# Patient Record
Sex: FEMALE | Race: WHITE | Employment: UNEMPLOYED | URBAN - METROPOLITAN AREA
[De-identification: names, ages, dates, MRNs, and addresses within clinical notes are randomized per-mention and may not be internally consistent; named-entity substitution may affect disease eponyms.]

---

## 2018-08-05 ENCOUNTER — HOSPITAL ENCOUNTER (EMERGENCY)
Age: 38
Discharge: HOME | End: 2018-08-05
Attending: EMERGENCY MEDICINE
Payer: MEDICARE

## 2018-08-05 VITALS
OXYGEN SATURATION: 98 % | TEMPERATURE: 98.4 F | BODY MASS INDEX: 23.92 KG/M2 | DIASTOLIC BLOOD PRESSURE: 85 MMHG | HEART RATE: 93 BPM | WEIGHT: 130 LBS | HEIGHT: 62 IN | RESPIRATION RATE: 16 BRPM | SYSTOLIC BLOOD PRESSURE: 136 MMHG

## 2018-08-05 DIAGNOSIS — H01.001 BLEPHARITIS OF RIGHT UPPER EYELID, UNSPECIFIED TYPE: Primary | ICD-10-CM

## 2018-08-05 PROCEDURE — 6370000000 HC RX 637 (ALT 250 FOR IP): Performed by: EMERGENCY MEDICINE

## 2018-08-05 PROCEDURE — 99283 EMERGENCY DEPT VISIT LOW MDM: CPT

## 2018-08-05 RX ORDER — ERYTHROMYCIN 5 MG/G
OINTMENT OPHTHALMIC
Qty: 1 G | Refills: 0 | Status: SHIPPED | OUTPATIENT
Start: 2018-08-05 | End: 2018-08-15

## 2018-08-05 RX ADMIN — FLUORESCEIN SODIUM 1 MG: 1 STRIP OPHTHALMIC at 17:52

## 2018-08-05 ASSESSMENT — PAIN SCALES - GENERAL: PAINLEVEL_OUTOF10: 8

## 2018-08-05 ASSESSMENT — PAIN DESCRIPTION - ORIENTATION: ORIENTATION: RIGHT

## 2018-08-05 ASSESSMENT — ENCOUNTER SYMPTOMS
EYE REDNESS: 0
EYE ITCHING: 1
EYE PAIN: 1

## 2018-08-05 ASSESSMENT — PAIN DESCRIPTION - PAIN TYPE: TYPE: ACUTE PAIN

## 2018-08-05 ASSESSMENT — PAIN DESCRIPTION - DESCRIPTORS: DESCRIPTORS: THROBBING

## 2018-08-05 ASSESSMENT — PAIN DESCRIPTION - LOCATION: LOCATION: EYE

## 2018-08-05 NOTE — ED PROVIDER NOTES
08/05/18 1740   BP: 136/85   Pulse: 93   Resp: 16   Temp: 98.4 °F (36.9 °C)   TempSrc: Oral   SpO2: 98%   Weight: 130 lb (59 kg)   Height: 5' 2\" (1.575 m)       Patient clinically has swelling of the right upper eyelid with tenderness without discrete hordeolum. I find this consistent with blepharitis. She is treated with erythromycin ophthalmic ointment. Warm compresses and massage to the eyelid. A Referral Given If Not Improved. MDM    CRITICAL CARE TIME   Total Critical Care time was  minutes, excluding separately reportable procedures. There was a high probability of clinically significant/life threatening deterioration in the patient's condition which required my urgent intervention. CONSULTS:  None    PROCEDURES:  Unless otherwise noted below, none     Procedures    FINAL IMPRESSION      1. Blepharitis of right upper eyelid, unspecified type          DISPOSITION/PLAN   DISPOSITION Decision To Discharge 08/05/2018 06:01:00 PM      PATIENT REFERRED TO:  DO Gil Peacock  [phone]      If not improved within 48 hours      DISCHARGE MEDICATIONS:  New Prescriptions    ERYTHROMYCIN (ROMYCIN) 5 MG/GM OPHTHALMIC OINTMENT    Apply a thin ribbon in your right eye 4 times daily.   May discontinue medication 2 days after eyelid swelling Redness resolved          (Please note that portions of this note were completed with a voice recognition program.  Efforts were made to edit the dictations but occasionally words are mis-transcribed.)    Maxx Soriano MD (electronically signed)  Attending Emergency Physician          Kacey Sewell MD  08/05/18 [id number]

## 2018-08-05 NOTE — ED TRIAGE NOTES
Patient arrives with right eye swelling and redness. Patient reports itching and pain. Patient denies visual changes. A/0 x3, ambulatory, resps even and unlabored on room air. Call bell in reach.

## 2018-11-25 ENCOUNTER — APPOINTMENT (OUTPATIENT)
Dept: GENERAL RADIOLOGY | Age: 38
End: 2018-11-25
Payer: COMMERCIAL

## 2018-11-25 ENCOUNTER — HOSPITAL ENCOUNTER (EMERGENCY)
Age: 38
Discharge: HOME | End: 2018-11-25
Attending: EMERGENCY MEDICINE
Payer: COMMERCIAL

## 2018-11-25 VITALS
BODY MASS INDEX: 23.92 KG/M2 | WEIGHT: 130 LBS | RESPIRATION RATE: 20 BRPM | DIASTOLIC BLOOD PRESSURE: 68 MMHG | OXYGEN SATURATION: 99 % | SYSTOLIC BLOOD PRESSURE: 118 MMHG | TEMPERATURE: 98.3 F | HEART RATE: 87 BPM | HEIGHT: 62 IN
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
## 2018-11-25 DIAGNOSIS — S93.402A MODERATE LEFT ANKLE SPRAIN, INITIAL ENCOUNTER: Primary | ICD-10-CM

## 2018-11-25 PROCEDURE — 73610 X-RAY EXAM OF ANKLE: CPT

## 2018-11-25 PROCEDURE — 99283 EMERGENCY DEPT VISIT LOW MDM: CPT

## 2018-11-25 ASSESSMENT — ENCOUNTER SYMPTOMS
BACK PAIN: 0
SHORTNESS OF BREATH: 0
ABDOMINAL PAIN: 0

## 2018-11-25 ASSESSMENT — PAIN DESCRIPTION - LOCATION
LOCATION: ANKLE
LOCATION: ANKLE

## 2018-11-25 ASSESSMENT — PAIN DESCRIPTION - DESCRIPTORS: DESCRIPTORS: CONSTANT;ACHING;SHARP

## 2018-11-25 ASSESSMENT — PAIN DESCRIPTION - PROGRESSION: CLINICAL_PROGRESSION: GRADUALLY WORSENING

## 2018-11-25 ASSESSMENT — PAIN DESCRIPTION - ORIENTATION
ORIENTATION: LEFT
ORIENTATION: LEFT

## 2018-11-25 ASSESSMENT — PAIN SCALES - GENERAL: PAINLEVEL_OUTOF10: 10

## 2018-11-25 ASSESSMENT — PAIN DESCRIPTION - ONSET: ONSET: ON-GOING

## 2018-11-25 ASSESSMENT — PAIN DESCRIPTION - PAIN TYPE
TYPE: ACUTE PAIN
TYPE: ACUTE PAIN

## 2018-11-25 ASSESSMENT — PAIN DESCRIPTION - FREQUENCY: FREQUENCY: CONTINUOUS

## 2018-11-26 NOTE — ED PROVIDER NOTES
Medications    No medications on file       ALLERGIES     Pcn [penicillins]    FAMILY HISTORY       Family History   Problem Relation Age of Onset    Heart Disease Mother     Cancer Father         Lung CA          SOCIAL HISTORY       Social History     Social History    Marital status: Single     Spouse name: N/A    Number of children: N/A    Years of education: N/A     Social History Main Topics    Smoking status: Current Every Day Smoker     Packs/day: 1.00     Years: 20.00     Types: Cigarettes    Smokeless tobacco: Never Used    Alcohol use No    Drug use: No    Sexual activity: Not Asked     Other Topics Concern    None     Social History Narrative    None       SCREENINGS      @FLOW([id number])@      PHYSICAL EXAM    (up to 7 for level 4, 8 or more for level 5)     ED Triage Vitals [11/25/18 2050]   BP Temp Temp Source Pulse Resp SpO2 Height Weight   128/71 98.3 °F (36.8 °C) Oral 107 -- 99 % 5' 2\" (1.575 m) 130 lb (59 kg)       Physical Exam  This is a 51-year-old female without distress. No head or facial injury seen. No neck tenderness. Some soreness to the chest diffusely without bruising bony crepitation or localization. Lungs clear and symmetric. Heart regular rhythm without murmur. Abdomen or upper arm soreness without pain. The glenohumeral elbow joints. No visible bruising. No pain distally with intact neurologic mass or status. Examination of left lower x-ray shows some mild bruising to the infrapatellar region to the left knee but no joint effusion bony crepitation or clinical bony injury. Left ankle is diffusely swollen with bruising laterally. Diffuse tenderness. No pain to the fifth metatarsal or foot. No pain above the ankle mortise. Neurologic and vascular status intact.     DIAGNOSTIC RESULTS     EKG: All EKG's are interpreted by the Emergency Department Physician who either signs or Co-signsthis chart in the absence of a cardiologist.        RADIOLOGY:   Katty Aragon